# Patient Record
Sex: MALE | Race: BLACK OR AFRICAN AMERICAN | Employment: FULL TIME | URBAN - NONMETROPOLITAN AREA
[De-identification: names, ages, dates, MRNs, and addresses within clinical notes are randomized per-mention and may not be internally consistent; named-entity substitution may affect disease eponyms.]

---

## 2019-04-13 ENCOUNTER — HOSPITAL ENCOUNTER (EMERGENCY)
Age: 39
Discharge: HOME | End: 2019-04-13

## 2019-04-13 ENCOUNTER — HOSPITAL ENCOUNTER (EMERGENCY)
Dept: GENERAL RADIOLOGY | Age: 39
Discharge: HOME | End: 2019-04-13

## 2019-04-13 VITALS
OXYGEN SATURATION: 98 % | BODY MASS INDEX: 24.44 KG/M2 | RESPIRATION RATE: 16 BRPM | HEART RATE: 76 BPM | HEIGHT: 69 IN | DIASTOLIC BLOOD PRESSURE: 76 MMHG | TEMPERATURE: 98.2 F | WEIGHT: 165 LBS | SYSTOLIC BLOOD PRESSURE: 123 MMHG

## 2019-04-13 DIAGNOSIS — M79.674 GREAT TOE PAIN, RIGHT: Primary | ICD-10-CM

## 2019-04-13 PROCEDURE — 99213 OFFICE O/P EST LOW 20 MIN: CPT | Performed by: NURSE PRACTITIONER

## 2019-04-13 PROCEDURE — 73630 X-RAY EXAM OF FOOT: CPT

## 2019-04-13 PROCEDURE — 99203 OFFICE O/P NEW LOW 30 MIN: CPT

## 2019-04-13 RX ORDER — IBUPROFEN 800 MG/1
800 TABLET ORAL EVERY 6 HOURS PRN
Qty: 120 TABLET | Refills: 3 | Status: SHIPPED | OUTPATIENT
Start: 2019-04-13

## 2019-04-13 ASSESSMENT — ENCOUNTER SYMPTOMS
GASTROINTESTINAL NEGATIVE: 1
ALLERGIC/IMMUNOLOGIC NEGATIVE: 1
EYES NEGATIVE: 1
BACK PAIN: 0
RESPIRATORY NEGATIVE: 1

## 2019-04-13 ASSESSMENT — PAIN DESCRIPTION - PAIN TYPE: TYPE: ACUTE PAIN

## 2019-04-13 ASSESSMENT — PAIN SCALES - GENERAL: PAINLEVEL_OUTOF10: 4

## 2019-04-13 ASSESSMENT — PAIN DESCRIPTION - LOCATION: LOCATION: FOOT

## 2019-04-13 ASSESSMENT — PAIN DESCRIPTION - FREQUENCY: FREQUENCY: CONTINUOUS

## 2019-04-13 ASSESSMENT — PAIN DESCRIPTION - ORIENTATION: ORIENTATION: RIGHT

## 2019-04-13 NOTE — ED TRIAGE NOTES
Lily Blackwell arrives by self  to room with complaint of r toe pain symptoms started 6 weeks ago. He reinjured it today while playing soccer with his son.

## 2019-04-13 NOTE — ED PROVIDER NOTES
[phone]       Chief Complaint   Patient presents with    Toe Pain       Nurses Notes reviewed and I agree except as noted in the HPI. HISTORY OF PRESENT ILLNESS   Daya Mayes is a 45 y.o. male who presents with right great toe pain for the past month. He was walking down the stairs and jammed it or \"tweaked it\" walking down the stairs. He has had persistent pain since then. Pain is intermittent, worse with walking on it. He hasn't done any ice or NSAIDS for it. REVIEW OF SYSTEMS     Review of Systems   Constitutional: Negative. HENT: Negative. Eyes: Negative. Respiratory: Negative. Cardiovascular: Negative. Gastrointestinal: Negative. Endocrine: Negative. Genitourinary: Negative. Musculoskeletal: Positive for arthralgias. Negative for back pain, gait problem, joint swelling, myalgias, neck pain and neck stiffness. Skin: Negative. Allergic/Immunologic: Negative. Neurological: Negative. Hematological: Negative. Psychiatric/Behavioral: Negative. PAST MEDICAL HISTORY   History reviewed. No pertinent past medical history. SURGICAL HISTORY     Patient  has a past surgical history that includes Appendectomy (9-8-15). CURRENT MEDICATIONS       Previous Medications    No medications on file       ALLERGIES     Patient is has No Known Allergies. FAMILY HISTORY     Patient'sfamily history includes Colon Cancer in his sister; High Blood Pressure in his maternal aunt and mother. SOCIAL HISTORY     Patient  reports that he has never smoked. He has never used smokeless tobacco. He reports that he drinks about 7.2 oz of alcohol per week. He reports that he does not use drugs. PHYSICAL EXAM     ED TRIAGE VITALS  BP: 123/76, Temp: 98.2 °F (36.8 °C), Pulse: 76, Resp: 16, SpO2: 98 %  Physical Exam   Constitutional: He is oriented to person, place, and time.    HENT:   Head: Normocephalic and atraumatic. Eyes: Pupils are equal, round, and reactive to light. EOM are normal.   Neck: Normal range of motion. Neck supple. Cardiovascular: Normal rate and regular rhythm. Pulmonary/Chest: Effort normal and breath sounds normal.   Abdominal: Soft. Bowel sounds are normal. There is no tenderness. Musculoskeletal:        Right foot: There is decreased range of motion, tenderness and bony tenderness. There is no swelling, normal capillary refill, no crepitus, no deformity and no laceration. Right great toe tender with palpation, mildly decreased ROM, no redness, swelling   Neurological: He is alert and oriented to person, place, and time. Skin: Skin is warm and dry. Psychiatric: He has a normal mood and affect. His behavior is normal. Judgment and thought content normal.   Nursing note and vitals reviewed. DIAGNOSTIC RESULTS   Labs: No results found for this visit on 04/13/19. IMAGING:  XR FOOT RIGHT (MIN 3 VIEWS)   Final Result   1. No acute fracture or malalignment. **This report has been created using voice recognition software. It may contain minor errors which are inherent in voice recognition technology. **      Final report electronically signed by Dr. Wicho Uribe on 4/13/2019 4:57 PM        URGENT CARE COURSE:     Vitals:    04/13/19 1620   BP: 123/76   Pulse: 76   Resp: 16   Temp: 98.2 °F (36.8 °C)   TempSrc: Temporal   SpO2: 98%   Weight: 165 lb (74.8 kg)   Height: 5' 9\" (1.753 m)       Medications - No data to display  PROCEDURES:  None  FINALIMPRESSION      1.  Great toe pain, right      - xray is normal  - advised to treat with ice, rest and NSAIDS  DISPOSITION/PLAN   DISPOSITION Decision To Discharge 04/13/2019 05:11:28 PM    PATIENT REFERRED TO:  [address] [facility]  [address] [facility]  [phone]    As needed    DISCHARGE MEDICATIONS:  New Prescriptions    IBUPROFEN (ADVIL;MOTRIN) 800 MG TABLET    Take 1 tablet by mouth every 6 hours as needed for Pain     Current Discharge Medication List          BARI Peguero CNP, APRN - CNP  04/13/19 9457